# Patient Record
Sex: MALE | URBAN - METROPOLITAN AREA
[De-identification: names, ages, dates, MRNs, and addresses within clinical notes are randomized per-mention and may not be internally consistent; named-entity substitution may affect disease eponyms.]

---

## 2021-05-27 ENCOUNTER — OFFICE VISIT (OUTPATIENT)
Dept: URBAN - METROPOLITAN AREA CLINIC 41 | Facility: CLINIC | Age: 66
End: 2021-05-27
Payer: COMMERCIAL

## 2021-05-27 DIAGNOSIS — Z96.1 PRESENCE OF INTRAOCULAR LENS: ICD-10-CM

## 2021-05-27 DIAGNOSIS — H31.093 OTHER CHORIORETINAL SCARS, BILATERAL: ICD-10-CM

## 2021-05-27 DIAGNOSIS — H44.23 DEGENERATIVE MYOPIA, BILATERAL: Primary | ICD-10-CM

## 2021-05-27 DIAGNOSIS — H35.373 PUCKERING OF MACULA, BILATERAL: ICD-10-CM

## 2021-05-27 PROCEDURE — 92134 CPTRZ OPH DX IMG PST SGM RTA: CPT | Performed by: OPHTHALMOLOGY

## 2021-05-27 PROCEDURE — 99214 OFFICE O/P EST MOD 30 MIN: CPT | Performed by: OPHTHALMOLOGY

## 2021-05-27 ASSESSMENT — INTRAOCULAR PRESSURE
OD: 12
OS: 12

## 2021-05-27 NOTE — IMPRESSION/PLAN
Impression: Puckering of macula, bilateral: H35.373. Plan: Very mild with good VA, s/p RD repair OU. Rec obs with AG. Patient agrees.

## 2021-05-27 NOTE — IMPRESSION/PLAN
Impression: Degenerative myopia, bilateral: H44.23. OU.
OCT OU = no SRF/IRF OU; tr ERM OU;  / 318  Plan: The fundi appearance is consistent with myopic degeneration. The patient does have some pigmentary changes. These may be secondary to myopic changes vs dry AMD. An OCT today confirmed normal foveal architecture without subretinal fluid or CME. We will continue to observe without treatment at this time. 

1 year, OCT/DFE OU

## 2021-05-27 NOTE — IMPRESSION/PLAN
Impression: Other chorioretinal scars, bilateral: H31.093. OU.
s/p SB/PPV/EL OU at [facility]: Stable. No new RD/RT. D/w pt.

## 2022-06-06 ENCOUNTER — OFFICE VISIT (OUTPATIENT)
Dept: URBAN - METROPOLITAN AREA CLINIC 13 | Facility: CLINIC | Age: 67
End: 2022-06-06
Payer: COMMERCIAL

## 2022-06-06 DIAGNOSIS — Z96.1 PRESENCE OF INTRAOCULAR LENS: ICD-10-CM

## 2022-06-06 DIAGNOSIS — H35.373 PUCKERING OF MACULA, BILATERAL: ICD-10-CM

## 2022-06-06 DIAGNOSIS — H31.093 OTHER CHORIORETINAL SCARS, BILATERAL: ICD-10-CM

## 2022-06-06 DIAGNOSIS — H44.23 DEGENERATIVE MYOPIA, BILATERAL: Primary | ICD-10-CM

## 2022-06-06 PROCEDURE — 99214 OFFICE O/P EST MOD 30 MIN: CPT | Performed by: OPHTHALMOLOGY

## 2022-06-06 PROCEDURE — 92134 CPTRZ OPH DX IMG PST SGM RTA: CPT | Performed by: OPHTHALMOLOGY

## 2022-06-06 ASSESSMENT — INTRAOCULAR PRESSURE
OD: 15
OS: 16

## 2022-06-06 NOTE — IMPRESSION/PLAN
Impression: Other chorioretinal scars, bilateral: H31.093. OU.
s/p SB/PPV/EL OU at [facility]: Stable. No new RD/RT. D/w pt.

## 2022-06-06 NOTE — IMPRESSION/PLAN
Impression: Puckering of macula, bilateral: H35.373. Plan: Very mild with good VA, s/p RD repair OU. RBA's d/w patient. Rec obs with AG. Patient agrees.

## 2022-06-06 NOTE — IMPRESSION/PLAN
Impression: Degenerative myopia, bilateral: H44.23. OU.
OCT OU = no SRF/IRF OU; tr ERM OU;  / 355 Plan: The fundi appearance is consistent with myopic degeneration. The patient does have some pigmentary changes. These may be secondary to myopic changes vs dry AMD. An OCT today confirmed normal foveal architecture without subretinal fluid or CME. We will continue to observe without treatment at this time. 

1 year, OCT/DFE OU